# Patient Record
(demographics unavailable — no encounter records)

---

## 2018-01-01 NOTE — ER DOCUMENT REPORT
ED General





- General


Chief Complaint: Other


Stated Complaint: SLEEPING CONCERNS


Time Seen by Provider: 18 09:13


Mode of Arrival: Ambulatory


Information source: Parent


TRAVEL OUTSIDE OF THE [place] IN LAST 30 DAYS: No





- HPI


Notes: 





11-day-old female presents with parents today for complaints of patient not 

waking immediately after napping.  Patient was a vaginal birth at 41 weeks 

without complications.  Patient is breast-fed.  Patient's weight was 9.2 ounces 

at birth.  Patient was last seen at her pediatrician's office on Monday, patient

's weight was 9 lbs. 2 oz. as well.  Mother is alternating between breast-

feeding as well as breast milk from a bottle.  Patient is sleeping for 

approximately 3-4 hours sometimes before mother tries to feed patient.  Mother 

states that "it seems like she is hungry so I do not wake her".  More than 6 

wet diapers a day, mother states she is nursing approximately 7 minutes on one 

side and approximately 3 minutes on the other side.  denies fevers, chills,  

chest pain,palpitations,  shortness of breath, dyspnea, nausea, vomiting, 

diarrhea, abdominal pain, hematuria,blurred vision, double vision, loss of 

vision, speech changes, LH, dizziness, syncope, headaches, wheezing, ST, URI, 

neck pain, weakness, bowel or bladder dysfunction, saddle anesthesia, numbness 

or tingling in bilateral upper or lower extremities equally, muscle paralysis, 

weakness in bilateral upper or lower extremities equally or rash. Denies IV 

drug use.





- Related Data


Allergies/Adverse Reactions: 


 





No Known Allergies Allergy (Verified 18 08:39)


 











Past Medical History





- General


Information source: Parent





- Social History


Smoking Status: Never Smoker


Chew tobacco use (# tins/day): No


Frequency of alcohol use: None


Drug Abuse: None


Family History: Reviewed & Not Pertinent


Patient has suicidal ideation: No


Patient has homicidal ideation: No


Renal/ Medical History: Denies: Hx Peritoneal Dialysis





Review of Systems





- Review of Systems


Constitutional: No symptoms reported


EENT: No symptoms reported


Cardiovascular: No symptoms reported


Respiratory: No symptoms reported


Gastrointestinal: No symptoms reported


Genitourinary: No symptoms reported


Female Genitourinary: No symptoms reported


Musculoskeletal: No symptoms reported


Skin: No symptoms reported


Hematologic/Lymphatic: No symptoms reported


Neurological/Psychological: No symptoms reported





Physical Exam





- Vital signs


Vitals: 


 











Temp Pulse BP Pulse Ox


 


 98.8 F   145   83/53   99 


 


 18 09:17  18 09:17  18 09:17  18 09:17











Interpretation: Normal





- General


General appearance: Appears well


General appearance pediatric: Attentiveness normal, Consolable, Fontanel flat, 

Good eye contact, Normal feed/suck, Normotensive, Sleeping/easily aroused


In distress: None





- HEENT


Head: Normocephalic


Eyes: Normal


Conjunctiva: Normal


Cornea: Normal


Eyelashes: Normal


Pupils: PERRL


Nasal: Normal


Mouth/Lips: Normal


Pharynx: Normal


Neck: Normal





- Respiratory


Respiratory status: No respiratory distress


Chest status: Nontender


Breath sounds: Normal


Chest palpation: Normal





- Cardiovascular


Rhythm: Regular


Heart sounds: Normal auscultation


Murmur: No


Normal capillary refill: Yes





- Abdominal


Inspection: Normal


Distension: No distension


Bowel sounds: Normal


Tenderness: Nontender


Organomegaly: No organomegaly





- Genitourinary


External exam: Normal.  No: Lesions





- Back


Back: Normal, Nontender





- Extremities


General upper extremity: Normal inspection, Nontender, Normal ROM, Normal 

strength


General lower extremity: Normal inspection, Nontender, Normal ROM, Normal 

strength


Hip: Other - No hip dysplasia noted bilaterally





- Neurological


Neuro grossly intact: Yes


Cognition: Normal





- Skin


Skin Temperature: Warm


Skin Moisture: Dry


Skin Color: Normal





Course





- Re-evaluation


Re-evalutation: 





18 10:51








11-day-old healthy appearing infant presents today with mother and father for 

concerns of patient not waking immediately after napping.  Discussed with 

mother and father that you need to stimulate child 2 weeks such as removing her 

clothes, turning on all the lights, using a cool washcloth on her face, turning 

on the TV, etc. advised mother to put her in a warm bath to help wake her up.  

Assured parents that she is a healthy child.  She did weigh in today at 9 lbs. 

7 oz.  Patient was nursed in the room, mother states that she had for 7 minutes 

on her left breast and 3 minutes on her right breast.  All questions and 

concerns answered by this provider.    Patient was discharged home with 

instructions to follow-up with her pediatrician tomorrow.





- Vital Signs


Vital signs: 


 











Temp Pulse Resp BP Pulse Ox


 


 98.8 F   180 H  24 L  83/53   97 


 


 18 09:17  02/23/18 10:27  18 10:27  18 09:17  18 10:27














Discharge





- Discharge


Clinical Impression: 


 Well child check,  8-28 days old





Condition: Good


Disposition: HOME, SELF-CARE


Additional Instructions: 


Continue breast-feeding every 2 hours.


Wake child by taking off clothes, turning on the lights, cooperative loss to 

face,  stimulate baby to feed, etc


Follow-up with pediatrician tomorrow.


Return if any fevers began, <6 wet diapers a day, not feeding or any other 

concerning symptoms.





Return immediately for any new or worsening symptoms.





Follow up with primary care provider, call tomorrow to make followup 

appointment.





Referrals: 


CHARLES SHIELDS MD [Primary Care Provider] - Follow up tomorrow